# Patient Record
Sex: MALE | Race: BLACK OR AFRICAN AMERICAN | Employment: PART TIME | URBAN - METROPOLITAN AREA
[De-identification: names, ages, dates, MRNs, and addresses within clinical notes are randomized per-mention and may not be internally consistent; named-entity substitution may affect disease eponyms.]

---
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
## 2020-09-18 ENCOUNTER — HOSPITAL ENCOUNTER (OUTPATIENT)
Age: 20
Setting detail: SPECIMEN
Discharge: HOME | End: 2020-09-18
Payer: COMMERCIAL

## 2020-09-18 ENCOUNTER — OFFICE VISIT (OUTPATIENT)
Dept: FAMILY MEDICINE CLINIC | Age: 20
End: 2020-09-18
Payer: COMMERCIAL

## 2020-09-18 VITALS
WEIGHT: 123.2 LBS | HEART RATE: 96 BPM | TEMPERATURE: 98.2 F | BODY MASS INDEX: 20.53 KG/M2 | SYSTOLIC BLOOD PRESSURE: 131 MMHG | HEIGHT: 65 IN | DIASTOLIC BLOOD PRESSURE: 80 MMHG

## 2020-09-18 PROBLEM — Z11.4 ENCOUNTER FOR SCREENING FOR HIV: Status: ACTIVE | Noted: 2020-09-18

## 2020-09-18 PROBLEM — Z20.2 STD EXPOSURE: Status: ACTIVE | Noted: 2020-09-18

## 2020-09-18 PROBLEM — Z11.59 NEED FOR HEPATITIS C SCREENING TEST: Status: ACTIVE | Noted: 2020-09-18

## 2020-09-18 PROBLEM — Z86.59 HISTORY OF ADHD: Status: ACTIVE | Noted: 2020-09-18

## 2020-09-18 PROBLEM — R74.8 ELEVATED ALKALINE PHOSPHATASE LEVEL: Status: ACTIVE | Noted: 2020-09-18

## 2020-09-18 PROBLEM — R36.9 PENILE DISCHARGE: Status: ACTIVE | Noted: 2020-09-18

## 2020-09-18 PROBLEM — E55.9 HYPOVITAMINOSIS D: Status: ACTIVE | Noted: 2020-09-18

## 2020-09-18 LAB
ALBUMIN SERPL-MCNC: 4.6 G/DL (ref 3.5–5.2)
ALBUMIN/GLOBULIN RATIO: 1.4 (ref 1–2.5)
ALP BLD-CCNC: 91 U/L (ref 40–129)
ALT SERPL-CCNC: 13 U/L (ref 5–41)
ANION GAP SERPL CALCULATED.3IONS-SCNC: 12 MMOL/L (ref 9–17)
AST SERPL-CCNC: 20 U/L
BILIRUB SERPL-MCNC: 0.68 MG/DL (ref 0.3–1.2)
BILIRUBIN DIRECT: 0.17 MG/DL
BILIRUBIN, INDIRECT: 0.51 MG/DL (ref 0–1)
BUN BLDV-MCNC: 9 MG/DL (ref 6–20)
CALCIUM SERPL-MCNC: 10 MG/DL (ref 8.6–10.4)
CHLORIDE BLD-SCNC: 102 MMOL/L (ref 98–107)
CO2: 25 MMOL/L (ref 20–31)
CREAT SERPL-MCNC: 0.9 MG/DL (ref 0.7–1.2)
GFR AFRICAN AMERICAN: >60 ML/MIN
GFR NON-AFRICAN AMERICAN: >60 ML/MIN
GFR SERPL CREATININE-BSD FRML MDRD: NORMAL ML/MIN/{1.73_M2}
GFR SERPL CREATININE-BSD FRML MDRD: NORMAL ML/MIN/{1.73_M2}
GLUCOSE BLD-MCNC: 92 MG/DL (ref 70–99)
HAV IGM SER IA-ACNC: NONREACTIVE
HEPATITIS B CORE IGM ANTIBODY: NONREACTIVE
HEPATITIS B SURFACE ANTIGEN: NONREACTIVE
HEPATITIS C ANTIBODY: NONREACTIVE
HIV AG/AB: NONREACTIVE
POTASSIUM SERPL-SCNC: 3.8 MMOL/L (ref 3.7–5.3)
SODIUM BLD-SCNC: 139 MMOL/L (ref 135–144)
T. PALLIDUM, IGG: NONREACTIVE
TOTAL PROTEIN: 7.9 G/DL (ref 6.4–8.3)
VITAMIN D 25-HYDROXY: 27.3 NG/ML (ref 30–100)

## 2020-09-18 PROCEDURE — G8427 DOCREV CUR MEDS BY ELIG CLIN: HCPCS | Performed by: STUDENT IN AN ORGANIZED HEALTH CARE EDUCATION/TRAINING PROGRAM

## 2020-09-18 PROCEDURE — 6360000002 HC RX W HCPCS

## 2020-09-18 PROCEDURE — 4004F PT TOBACCO SCREEN RCVD TLK: CPT | Performed by: STUDENT IN AN ORGANIZED HEALTH CARE EDUCATION/TRAINING PROGRAM

## 2020-09-18 PROCEDURE — 96372 THER/PROPH/DIAG INJ SC/IM: CPT

## 2020-09-18 PROCEDURE — G8420 CALC BMI NORM PARAMETERS: HCPCS | Performed by: STUDENT IN AN ORGANIZED HEALTH CARE EDUCATION/TRAINING PROGRAM

## 2020-09-18 PROCEDURE — 99203 OFFICE O/P NEW LOW 30 MIN: CPT | Performed by: STUDENT IN AN ORGANIZED HEALTH CARE EDUCATION/TRAINING PROGRAM

## 2020-09-18 PROCEDURE — 99201 HC NEW PT, E/M LEVEL 1: CPT | Performed by: STUDENT IN AN ORGANIZED HEALTH CARE EDUCATION/TRAINING PROGRAM

## 2020-09-18 RX ORDER — AZITHROMYCIN 250 MG/1
1000 TABLET, FILM COATED ORAL ONCE
Status: COMPLETED | OUTPATIENT
Start: 2020-09-18 | End: 2020-09-18

## 2020-09-18 RX ORDER — CHOLECALCIFEROL (VITAMIN D3) 125 MCG
5 CAPSULE ORAL NIGHTLY PRN
COMMUNITY
End: 2022-06-08

## 2020-09-18 RX ORDER — CEFTRIAXONE SODIUM 250 MG/1
250 INJECTION, POWDER, FOR SOLUTION INTRAMUSCULAR; INTRAVENOUS ONCE
Status: COMPLETED | OUTPATIENT
Start: 2020-09-18 | End: 2020-09-18

## 2020-09-18 RX ADMIN — CEFTRIAXONE SODIUM 250 MG: 250 INJECTION, POWDER, FOR SOLUTION INTRAMUSCULAR; INTRAVENOUS at 10:14

## 2020-09-18 RX ADMIN — AZITHROMYCIN 1000 MG: 250 TABLET, FILM COATED ORAL at 10:23

## 2020-09-18 ASSESSMENT — PATIENT HEALTH QUESTIONNAIRE - PHQ9
SUM OF ALL RESPONSES TO PHQ QUESTIONS 1-9: 1
SUM OF ALL RESPONSES TO PHQ9 QUESTIONS 1 & 2: 1
1. LITTLE INTEREST OR PLEASURE IN DOING THINGS: 0
SUM OF ALL RESPONSES TO PHQ QUESTIONS 1-9: 1
2. FEELING DOWN, DEPRESSED OR HOPELESS: 1

## 2020-09-18 NOTE — PROGRESS NOTES
Subjective:    Celia Hurtado is a 21 y.o. male with  has a past medical history of ADHD (attention deficit hyperactivity disorder). Family History   Problem Relation Age of Onset    High Blood Pressure Mother     Diabetes Mother     Anemia Mother     Allergy (Severe) Mother         Seasonal    No Known Problems Father     No Known Problems Sister     No Known Problems Brother        Presented tothe office today for:  Chief Complaint   Patient presents with    New Patient     Est. Care    Exposure to STD     Girlfriend positive for Chlamydia/Gonorrhea     HPI  Establish care w/ PCP  Last PCP was years ago    CC:  STD Exposure, Chlamydia/Gonorrhea    Characteristics - patient has some discharge, yellow started a few days ago after having unprotected sex with his girlfriend. No dysuria, increased frequency, patient denies any hematuria or other urinary symptoms at this time. Sexual History  Partner(s) - 1 partner  Past Hx of STI (e.g. GC/Chlamydia, Syphillis) - none in the past  Practices (viginal, anal, oral) - vaginal  Safety - patient does not use condoms    Hx of ADHD, combined  Patient had been following with [facility] for history of ADHD, he had been on Vyvanse as well as dextroamphetamine, last fill date was May 2020. Last PDMP [place] [address] as Reviewed:  Review User Review Instant Review Result   ROBBIEJAVYJORJE 9/18/2020  9:13 AM Reviewed PDMP [1]      Hypovitaminosis D/Elevated AlkP  Patient has a history of low vitamin D 15.8, last checked in 2018. Also had an elevated alk phos in 2018, with no repeat labs since. Health Maintenance -   Depression- PHQ1 today  Alcohol misuse - no heavy drinking, ocassionally  Smoking - yes, marijuana. Review of Systems  Review of Systems   Constitutional: Negative for activity change, appetite change, chills, diaphoresis, fatigue, fever and unexpected weight change. HENT: Negative for sinus pressure, sinus pain, sore throat and trouble swallowing. Respiratory: Negative for cough, shortness of breath and wheezing. Cardiovascular: Negative for chest pain, palpitations and leg swelling. Gastrointestinal: Negative for abdominal pain, diarrhea, nausea and vomiting. Endocrine: Negative for cold intolerance, polydipsia, polyphagia and polyuria. Genitourinary: Negative for difficulty urinating, flank pain and frequency. Positive for STD exposure. Musculoskeletal: Negative for gait problem and joint swelling. Negative for back pain, neck pain and neck stiffness. Skin: Negative for color change and wound. Negative for pallor and rash. Allergic/Immunologic: Negative for environmental allergies and food allergies. Neurological: Negative for light-headedness, numbness and headaches. Psychiatric/Behavioral: Negative for sleep disturbance. Negative for confusion and suicidal ideas. Objective:    /80 (Site: Left Upper Arm, Position: Sitting, Cuff Size: Medium Adult) Comment: machine  Pulse 96   Temp 98.2 °F (36.8 °C) (Temporal)   Ht 5' 4.96\" (1.65 m)   Wt 123 lb 3.2 oz (55.9 kg)   BMI 20.53 kg/m²    BP Readings from Last 3 Encounters:   09/18/20 131/80       Physical Exam  Exam conducted with a chaperone present. Abdominal:      Hernia: There is no hernia in the left inguinal area or right inguinal area. Genitourinary:     Pubic Area: No rash or pubic lice. Penis: Discharge present. No phimosis, paraphimosis, hypospadias, erythema, tenderness, swelling or lesions. Scrotum/Testes: Normal.         Right: Mass, tenderness, swelling, testicular hydrocele or varicocele not present. Right testis is descended. Cremasteric reflex is present. Left: Mass, tenderness, swelling, testicular hydrocele or varicocele not present. Left testis is descended. Cremasteric reflex is present. Lymphadenopathy:      Lower Body: No right inguinal adenopathy. No left inguinal adenopathy.        Constitutional: Patient is oriented to person, place, and time. Patient appears well-developed and well-nourished. No distress. HENT: Head: Normocephalic and atraumatic. Eyes: Pupils are equal, round, and reactive to light. Conjunctivae are normal. Right eye exhibits no discharge. Left eye exhibits no discharge. Cardiovascular: Normal rate, regular rhythm and normal heart sounds. Pulmonary/Chest: Effort normal and breath sounds normal. No respiratory distress. Patient has no wheezes. : Examined w/ Pio Rubi MA present in the room. Abdominal: Soft. Bowel sounds are normal. Patient exhibits no distension. There is no tenderness. Musculoskeletal:  Patient exhibits no edema and tenderness. Patient exhibits no deformity. Neurological: Patient is alert and oriented to person, place, and time. Skin: Skin is warm and dry. Patient is not diaphoretic. Psychiatric: Patient's speech is normal and behavior is normal. Thought content normal. Patient's mood appears anxious. Vitals reviewed. Lab Results   Component Value Date    WBC 10.5 08/17/2018    HGB 14.2 08/17/2018    HCT 44.3 08/17/2018     08/17/2018    HDL 35 (L) 08/17/2018    ALT 12 08/17/2018    AST 19 08/17/2018     08/17/2018    K 4.3 08/17/2018     08/17/2018    CREATININE 0.84 08/17/2018    BUN 14 08/17/2018    CO2 26 08/17/2018    TSH 2.49 08/17/2018    LABA1C 5.6 08/17/2018     Lab Results   Component Value Date    CALCIUM 9.6 08/17/2018     Lab Results   Component Value Date    LDLCHOLESTEROL 82 08/17/2018       Assessment and Plan:    1. Penile discharge  -treating empirically today, plan for recheck in 3 month's time or earlier if there are any other signs/symptoms and to make sure there is resolution of symptoms. . RTC sooner if needed.  Discussed safe sex practices w/ patient and that Flo may contact the patient per their polices pending the results of the GC/Chlamydia  - azithromycin (ZITHROMAX) tablet 1,000 mg  - cefTRIAXone (ROCEPHIN) injection 250 mg    2. STD exposure  - Chlamydia/GC DNA, Urine; Future  - T. Pallidum Ab; Future    3. Hypovitaminosis D  - Vitamin D 25 Hydroxy; Future, pending results, may consider supplementation.  - Comprehensive Metabolic w/Bili Profile; Future    4. History of ADHD  -continue w/ [place]/Psychiatrist, symptoms are stable at this time. 5. Elevated alkaline phosphatase level  - Comprehensive Metabolic w/Bili Profile; Future    6. Encounter for screening for HIV  - HIV Screen; Future    7. Need for hepatitis C screening test  - Hepatitis Panel, Acute; Future      Requested Prescriptions      No prescriptions requested or ordered in this encounter       There are no discontinued medications. Romario Thompson received counseling on the following healthy behaviors: nutrition, exercise and medication adherence    Discussed use, benefit, and side effects of prescribed medications. Barriers to medication compliance addressed. All patient questions answered. Pt voiced understanding, with use of teach-back method. Return in about 3 months (around 12/18/2020), or if symptoms worsen or fail to improve, for f/u GV/Chlamydia. HM - Flu vaccine when available. Patient will obtain the HPV vaccine during next encounter, he will think about getting the Tdap risks and benefits as well as alternatives were provided to the patient. Patient will also obtain records for review prior to immunizations.

## 2020-09-18 NOTE — PROGRESS NOTES
Visit Information    Have you changed or started any medications since your last visit including any over-the-counter medicines, vitamins, or herbal medicines? no   Have you stopped taking any of your medications? Is so, why? -  no  Are you having any side effects from any of your medications? - no    Have you seen any other physician or provider since your last visit?  no   Have you had any other diagnostic tests since your last visit?  no   Have you been seen in the emergency room and/or had an admission in a hospital since we last saw you?  no   Have you had your routine dental cleaning in the past 6 months?  yes - July     Do you have an active [employer] account? If no, what is the barrier?   No: Pending    Patient Care Team:  Olu Grider MD as PCP - General (Family Medicine)    Medical History Review  Past Medical, Family, and Social History reviewed and does not contribute to the patient presenting condition    Health Maintenance   Topic Date Due    Varicella vaccine (1 of 2 - 2-dose childhood series) 08/05/2001    HPV vaccine (1 - Male 2-dose series) 08/05/2011    HIV screen  08/05/2015    DTaP/Tdap/Td vaccine (1 - Tdap) 08/05/2019    Flu vaccine (1) 09/01/2020    Hepatitis A vaccine  Aged Out    Hepatitis B vaccine  Aged Out    Hib vaccine  Aged Out    Meningococcal (ACWY) vaccine  Aged Out    Pneumococcal 0-64 years Vaccine  Aged Out

## 2020-09-18 NOTE — LETTER
To whom it may concern,          Consuelo Tierney, was in our office today for a doctors appointment with     her son Gely Rocha. Please excuse her from work for today. She may return     tomorrow 09/19/2020. Any questions please call office at [phone].               Sincerely,          Charity Allen

## 2020-09-18 NOTE — PATIENT INSTRUCTIONS
Thank you for letting us take care of you today. We hope all your questions were addressed. If a question was overlooked or something else comes to mind after you return home, please contact a member of your Care Team listed below. Your Care Team at [place] [address] is Team #1  Hal Arellano MD (Faculty)  Amy Dan (Resident)  Aura Miles MD (Resident)  Hunter Rodriguez, [facility] office)  Selvin Johnson, [address] (Clinical Practice Manager)  Isidro Pineda, [address] [facility] (Clinical Pharmacist)     Office phone number: [phone]    If you need to get in right away due to illness, please be advised we have \"Same Day\" appointments available Monday-Friday. Please call us at [phone] option #3 to schedule your \"Same Day\" appointment.

## 2020-09-19 LAB
CULTURE: NO GROWTH
Lab: NORMAL
SPECIMEN DESCRIPTION: NORMAL

## 2020-09-21 LAB
C. TRACHOMATIS DNA ,URINE: ABNORMAL
N. GONORRHOEAE DNA, URINE: ABNORMAL
SPECIMEN DESCRIPTION: ABNORMAL

## 2020-09-21 RX ORDER — MELATONIN
1000 DAILY
Qty: 90 TABLET | Refills: 1 | Status: SHIPPED | OUTPATIENT
Start: 2020-09-21 | End: 2022-06-08

## 2020-09-21 RX ORDER — MULTIVIT-MIN/IRON FUM/FOLIC AC 7.5 MG-4
1 TABLET ORAL DAILY
Qty: 90 TABLET | Refills: 1 | Status: SHIPPED | OUTPATIENT
Start: 2020-09-21 | End: 2022-06-08

## 2020-10-18 PROBLEM — Z11.4 ENCOUNTER FOR SCREENING FOR HIV: Status: RESOLVED | Noted: 2020-09-18 | Resolved: 2020-10-18

## 2020-10-18 PROBLEM — Z11.59 NEED FOR HEPATITIS C SCREENING TEST: Status: RESOLVED | Noted: 2020-09-18 | Resolved: 2020-10-18

## 2022-06-08 ENCOUNTER — HOSPITAL ENCOUNTER (EMERGENCY)
Age: 22
Discharge: HOME | End: 2022-06-08
Attending: EMERGENCY MEDICINE
Payer: COMMERCIAL

## 2022-06-08 ENCOUNTER — APPOINTMENT (OUTPATIENT)
Dept: GENERAL RADIOLOGY | Age: 22
End: 2022-06-08
Payer: COMMERCIAL

## 2022-06-08 VITALS
OXYGEN SATURATION: 100 % | BODY MASS INDEX: 21.66 KG/M2 | RESPIRATION RATE: 14 BRPM | DIASTOLIC BLOOD PRESSURE: 78 MMHG | HEART RATE: 85 BPM | SYSTOLIC BLOOD PRESSURE: 132 MMHG | TEMPERATURE: 97.9 F | WEIGHT: 130 LBS

## 2022-06-08 DIAGNOSIS — S60.222A CONTUSION OF LEFT HAND, INITIAL ENCOUNTER: Primary | ICD-10-CM

## 2022-06-08 PROCEDURE — 99283 EMERGENCY DEPT VISIT LOW MDM: CPT

## 2022-06-08 PROCEDURE — 73130 X-RAY EXAM OF HAND: CPT

## 2022-06-08 ASSESSMENT — PAIN DESCRIPTION - LOCATION: LOCATION: HAND

## 2022-06-08 ASSESSMENT — PAIN DESCRIPTION - PAIN TYPE: TYPE: ACUTE PAIN

## 2022-06-08 ASSESSMENT — PAIN DESCRIPTION - ORIENTATION: ORIENTATION: LEFT

## 2022-06-08 ASSESSMENT — PAIN SCALES - GENERAL: PAINLEVEL_OUTOF10: 10

## 2022-06-08 ASSESSMENT — PAIN - FUNCTIONAL ASSESSMENT: PAIN_FUNCTIONAL_ASSESSMENT: 0-10

## 2022-06-08 NOTE — ED TRIAGE NOTES
23 yo male had free weights fall on left hand (#90 each side of bar, total #180)  This happened at approximately 1400  Left hand swollen, painful to move fingers  Pt given ice pack

## 2022-06-08 NOTE — Clinical Note
Rosa Isela Jackson was seen and treated in our emergency department on 6/8/2022. He may return to work on 06/09/2022. If you have any questions or concerns, please don't hesitate to call.       Cooper Hickey, DO

## 2022-06-08 NOTE — ED PROVIDER NOTES
[facility]  ED  Emergency Department Encounter  EmergencyMedicine Resident     Pt Name:Mervin Thakur  MRN: [id number]  [place] 2000  Date of evaluation: 6/8/22  PCP:  Farhad Martin MD    [address] [place] [address]       Chief Complaint   Patient presents with    Hand Injury     free weights (total #180) dropped on left hand at approx 1400       HISTORY OF PRESENT ILLNESS  (Location/Symptom, Timing/Onset, Context/Setting, Quality, Duration, Modifying Factors, Severity.)      Violet Christensen is a 24 y.o. male who presents with hand pain. Patient states he was lifting weights and dropped a weight on his left hand. Patient is right-hand dominant. Patient states he has increased pain swelling decreased range of motion secondary to pain. Denies any other medical problems states he is use ice is not taking medications for symptoms. Denies any numbness or tingling. PAST MEDICAL / SURGICAL / SOCIAL / FAMILY HISTORY      has a past medical history of ADHD (attention deficit hyperactivity disorder). has no past surgical history on file. Social History     Socioeconomic History    Marital status: Single     Spouse name: Not on file    Number of children: Not on file    Years of education: Not on file    Highest education level: Not on file   Occupational History    Not on file   Tobacco Use    Smoking status: Current Some Day Smoker     Packs/day: 0.02     Years: 2.00     Pack years: 0.04     Types: Cigars    Smokeless tobacco: Never Used    Tobacco comment: occasional   Vaping Use    Vaping Use: Some days    Substances: Always   Substance and Sexual Activity    Alcohol use:  Yes    Drug use: Not Currently     Types: Marijuana Gaynelle [place])    Sexual activity: Yes   Other Topics Concern    Not on file   Social History Narrative    Not on file     Social Determinants of Health     Financial Resource Strain:     Difficulty of Paying Living Expenses: Not on file   Food Insecurity:     Worried About Running Out of Food in the Last Year: Not on file    Malaika of Food in the Last Year: Not on file   Transportation Needs:     Lack of Transportation (Medical): Not on file    Lack of Transportation (Non-Medical): Not on file   Physical Activity:     Days of Exercise per Week: Not on file    Minutes of Exercise per Session: Not on file   Stress:     Feeling of Stress : Not on file   Social Connections:     Frequency of Communication with Friends and Family: Not on file    Frequency of Social Gatherings with Friends and Family: Not on file    Attends Buddhism Services: Not on file    Active Member of [address] [place] [address] [employer] or Organizations: Not on file    Attends Club or Organization Meetings: Not on file    Marital Status: Not on file   Intimate Partner Violence:     Fear of Current or Ex-Partner: Not on file    Emotionally Abused: Not on file    Physically Abused: Not on file    Sexually Abused: Not on file   Housing Stability:     Unable to Pay for Housing in the Last Year: Not on file    Number of [place] in the Last Year: Not on file    Unstable Housing in the Last Year: Not on file       Family History   Problem Relation Age of Onset    High Blood Pressure Mother     Diabetes Mother     Anemia Mother     Allergy (Severe) Mother         Seasonal    No Known Problems Father     No Known Problems Sister     No Known Problems Brother        Allergies:  Patient has no known allergies. Home Medications:  Prior to Admission medications    Not on File       REVIEW OF SYSTEMS    (2-9 systems for level 4, 10 or more for level 5)      Review of Systems   Constitutional: Negative for chills and fever. Musculoskeletal:        Left hand pain   Skin: Negative for wound.        PHYSICAL EXAM   (up to 7 for level 4, 8 or more for level 5)      INITIAL VITALS:   /78   Pulse 85   Temp 97.9 °F (36.6 °C) (Oral)   Resp 14   Wt 130 lb (59 kg)   SpO2 100%   BMI 21.66 kg/m² Physical Exam  Vitals reviewed. Constitutional:       Comments: Patient is well-appearing sitting on on stretcher in no acute distress   HENT:      Head: Normocephalic and atraumatic. Cardiovascular:      Rate and Rhythm: Normal rate. Pulmonary:      Comments: Wheezing comfortable room air, symmetric chest rise, speaking full sentences, no evidence respirator stress  Musculoskeletal:      Comments: Mild left hand swelling, tenderness over the metacarpals, neurovascular intact, no obvious open lesions, decreased range of motion secondary due to a effort/pain no snuffbox tenderness   Neurological:      Gait: Gait normal.   Psychiatric:         Mood and Affect: Mood normal.         Behavior: Behavior normal.         Thought Content: Thought content normal.         Judgment: Judgment normal.         DIFFERENTIAL  DIAGNOSIS     PLAN (LABS / IMAGING / EKG):  Orders Placed This Encounter   Procedures    XR HAND LEFT (MIN 3 VIEWS)       MEDICATIONS ORDERED:  No orders of the defined types were placed in this encounter. DDX: Fracture, sprain, strain, contusion    DIAGNOSTIC RESULTS / EMERGENCY DEPARTMENT COURSE / MDM   :  No results found for this visit on 06/08/22. RADIOLOGY:  No acute osseous abnormality    EKG  None    All EKG's are interpreted by the Emergency Department Physician who either signs or Co-signs this chart in the absence of a cardiologist.    EMERGENCY DEPARTMENT COURSE/IMPRESSION: 22-year-old male present emergency department with left hand pain after weight dropped on his hand. Patient is right-hand dominant. Patient has some mild swelling, there is tenderness over the metacarpals mainly the third through fifth. We will plan for x-ray apply ice, patient declined any Motrin or Tylenol for pain control. No other medical problems.   X-ray was unremarkable, symptoms likely secondary to contusion, educated patient on symptomatic treatment with rest ice compression elevation Tylenol Motrin as needed, strict return precautions were discussed, educate patient follow primary care provider as needed. Ace Wrap applied for comfort. PROCEDURES:  None    CONSULTS:  None    CRITICAL CARE:  None    FINAL IMPRESSION      1. Contusion of left hand, initial encounter          DISPOSITION / PLAN     DISPOSITION Decision To Discharge 06/08/2022 03:27:48 PM      PATIENT REFERRED TO:  Malvin Chapman MD  [address]   [address]  [id number]  [phone]      As needed, If symptoms worsen    [facility]  [address] [facility]  [phone]    As needed, If symptoms worsen      DISCHARGE MEDICATIONS:  New Prescriptions    No medications on file       Lucila Smiley DO  Emergency Medicine Resident    (Please note that portions of thisnote were completed with a voice recognition program.  Efforts were made to edit the dictations but occasionally words are mis-transcribed.)     Lucila Smiley DO  Resident  06/08/22 [facility] [address] [place] [address], DO  Resident  06/08/22 [id number]

## 2022-06-08 NOTE — ED PROVIDER NOTES
[facility]  ED     Emergency Department     Faculty Attestation        I performed a history and physical examination of the patient and discussed management with the resident. I reviewed the residents note and agree with the documented findings and plan of care. Any areas of disagreement are noted on the chart. I was personally present for the key portions of any procedures. I have documented in the chart those procedures where I was not present during the key portions. I have reviewed the emergency nurses triage note. I agree with the chief complaint, past medical history, past surgical history, allergies, medications, social and family history as documented unless otherwise noted below. For mid-level providers such as nurse practitioners as well as physicians assistants:    I have personally seen and evaluated the patient. I find the patient's history and physical exam are consistent with NP/PA documentation. I agree with the care provided, treatment rendered, disposition, & follow-up plan. Additional findings are as noted. Vital Signs: There were no vitals taken for this visit.   PCP:  Farhad Martin MD    Pertinent Comments:           Critical Care  None          Gavino De La Torre MD    Attending Emergency Medicine Physician              Ammy Miller MD  06/08/22 [id number]

## 2023-02-17 ENCOUNTER — HOSPITAL ENCOUNTER (EMERGENCY)
Age: 23
Discharge: HOME | End: 2023-02-18
Attending: EMERGENCY MEDICINE
Payer: COMMERCIAL

## 2023-02-17 ENCOUNTER — APPOINTMENT (OUTPATIENT)
Dept: GENERAL RADIOLOGY | Age: 23
End: 2023-02-17
Payer: COMMERCIAL

## 2023-02-17 DIAGNOSIS — R41.82 ALTERED MENTAL STATUS, UNSPECIFIED ALTERED MENTAL STATUS TYPE: Primary | ICD-10-CM

## 2023-02-17 PROCEDURE — 93005 ELECTROCARDIOGRAM TRACING: CPT | Performed by: STUDENT IN AN ORGANIZED HEALTH CARE EDUCATION/TRAINING PROGRAM

## 2023-02-17 PROCEDURE — 80143 DRUG ASSAY ACETAMINOPHEN: CPT

## 2023-02-17 PROCEDURE — G0480 DRUG TEST DEF 1-7 CLASSES: HCPCS

## 2023-02-17 PROCEDURE — 99285 EMERGENCY DEPT VISIT HI MDM: CPT

## 2023-02-17 PROCEDURE — 84484 ASSAY OF TROPONIN QUANT: CPT

## 2023-02-17 PROCEDURE — 2580000003 HC RX 258: Performed by: STUDENT IN AN ORGANIZED HEALTH CARE EDUCATION/TRAINING PROGRAM

## 2023-02-17 PROCEDURE — 83605 ASSAY OF LACTIC ACID: CPT

## 2023-02-17 PROCEDURE — 80048 BASIC METABOLIC PNL TOTAL CA: CPT

## 2023-02-17 PROCEDURE — 85025 COMPLETE CBC W/AUTO DIFF WBC: CPT

## 2023-02-17 PROCEDURE — 80307 DRUG TEST PRSMV CHEM ANLYZR: CPT

## 2023-02-17 PROCEDURE — 80179 DRUG ASSAY SALICYLATE: CPT

## 2023-02-17 RX ORDER — 0.9 % SODIUM CHLORIDE 0.9 %
1000 INTRAVENOUS SOLUTION INTRAVENOUS ONCE
Status: COMPLETED | OUTPATIENT
Start: 2023-02-17 | End: 2023-02-18

## 2023-02-17 RX ADMIN — SODIUM CHLORIDE 1000 ML: 9 INJECTION, SOLUTION INTRAVENOUS at 23:51

## 2023-02-18 ENCOUNTER — APPOINTMENT (OUTPATIENT)
Dept: CT IMAGING | Age: 23
End: 2023-02-18
Payer: COMMERCIAL

## 2023-02-18 ENCOUNTER — APPOINTMENT (OUTPATIENT)
Dept: GENERAL RADIOLOGY | Age: 23
End: 2023-02-18
Payer: COMMERCIAL

## 2023-02-18 VITALS
WEIGHT: 130 LBS | SYSTOLIC BLOOD PRESSURE: 119 MMHG | HEART RATE: 84 BPM | TEMPERATURE: 97.8 F | OXYGEN SATURATION: 97 % | DIASTOLIC BLOOD PRESSURE: 70 MMHG | RESPIRATION RATE: 17 BRPM | BODY MASS INDEX: 21.66 KG/M2

## 2023-02-18 LAB
ABSOLUTE EOS #: 0.15 K/UL (ref 0–0.44)
ABSOLUTE IMMATURE GRANULOCYTE: 0.07 K/UL (ref 0–0.3)
ABSOLUTE LYMPH #: 4.96 K/UL (ref 1.1–3.7)
ABSOLUTE MONO #: 1.15 K/UL (ref 0.1–1.2)
ACETAMINOPHEN LEVEL: <5 UG/ML (ref 10–30)
ANION GAP SERPL CALCULATED.3IONS-SCNC: 23 MMOL/L (ref 9–17)
BASOPHILS # BLD: 1 % (ref 0–2)
BASOPHILS ABSOLUTE: 0.11 K/UL (ref 0–0.2)
BUN SERPL-MCNC: 11 MG/DL (ref 6–20)
CALCIUM SERPL-MCNC: 9 MG/DL (ref 8.6–10.4)
CHLORIDE SERPL-SCNC: 102 MMOL/L (ref 98–107)
CO2 SERPL-SCNC: 20 MMOL/L (ref 20–31)
CREAT SERPL-MCNC: 1.14 MG/DL (ref 0.7–1.2)
EKG ATRIAL RATE: 108 BPM
EKG P AXIS: 62 DEGREES
EKG P-R INTERVAL: 132 MS
EKG Q-T INTERVAL: 334 MS
EKG QRS DURATION: 86 MS
EKG QTC CALCULATION (BAZETT): 447 MS
EKG R AXIS: 79 DEGREES
EKG T AXIS: 38 DEGREES
EKG VENTRICULAR RATE: 108 BPM
EOSINOPHILS RELATIVE PERCENT: 1 % (ref 1–4)
ETHANOL PERCENT: <0.01 %
ETHANOL: <10 MG/DL
GFR SERPL CREATININE-BSD FRML MDRD: >60 ML/MIN/1.73M2
GLUCOSE SERPL-MCNC: 183 MG/DL (ref 70–99)
HCT VFR BLD AUTO: 45 % (ref 40.7–50.3)
HGB BLD-MCNC: 13.6 G/DL (ref 13–17)
IMMATURE GRANULOCYTES: 1 %
LACTIC ACID, WHOLE BLOOD: 2 MMOL/L (ref 0.7–2.1)
LACTIC ACID, WHOLE BLOOD: 6 MMOL/L (ref 0.7–2.1)
LYMPHOCYTES # BLD: 38 % (ref 24–43)
MCH RBC QN AUTO: 26.8 PG (ref 25.2–33.5)
MCHC RBC AUTO-ENTMCNC: 30.2 G/DL (ref 28.4–34.8)
MCV RBC AUTO: 88.8 FL (ref 82.6–102.9)
MONOCYTES # BLD: 9 % (ref 3–12)
NRBC AUTOMATED: 0 PER 100 WBC
PDW BLD-RTO: 14.3 % (ref 11.8–14.4)
PLATELET # BLD AUTO: 354 K/UL (ref 138–453)
PMV BLD AUTO: 9.8 FL (ref 8.1–13.5)
POTASSIUM SERPL-SCNC: 4 MMOL/L (ref 3.7–5.3)
RBC # BLD: 5.07 M/UL (ref 4.21–5.77)
REASON FOR REJECTION: NORMAL
SALICYLATE LEVEL: <1 MG/DL (ref 3–10)
SEG NEUTROPHILS: 50 % (ref 36–65)
SEGMENTED NEUTROPHILS ABSOLUTE COUNT: 6.78 K/UL (ref 1.5–8.1)
SODIUM SERPL-SCNC: 145 MMOL/L (ref 135–144)
TOXIC TRICYCLIC SC,BLOOD: NEGATIVE
TROPONIN I SERPL DL<=0.01 NG/ML-MCNC: 11 NG/L (ref 0–22)
WBC # BLD AUTO: 13.2 K/UL (ref 3.5–11.3)
ZZ NTE CLEAN UP: ORDERED TEST: NORMAL
ZZ NTE WITH NAME CLEAN UP: SPECIMEN SOURCE: NORMAL

## 2023-02-18 PROCEDURE — 70450 CT HEAD/BRAIN W/O DYE: CPT

## 2023-02-18 PROCEDURE — 93010 ELECTROCARDIOGRAM REPORT: CPT | Performed by: INTERNAL MEDICINE

## 2023-02-18 PROCEDURE — 71045 X-RAY EXAM CHEST 1 VIEW: CPT

## 2023-02-18 PROCEDURE — 36415 COLL VENOUS BLD VENIPUNCTURE: CPT

## 2023-02-18 PROCEDURE — 83605 ASSAY OF LACTIC ACID: CPT

## 2023-02-18 PROCEDURE — 87040 BLOOD CULTURE FOR BACTERIA: CPT

## 2023-02-18 PROCEDURE — 2580000003 HC RX 258: Performed by: STUDENT IN AN ORGANIZED HEALTH CARE EDUCATION/TRAINING PROGRAM

## 2023-02-18 RX ORDER — 0.9 % SODIUM CHLORIDE 0.9 %
1000 INTRAVENOUS SOLUTION INTRAVENOUS ONCE
Status: COMPLETED | OUTPATIENT
Start: 2023-02-18 | End: 2023-02-18

## 2023-02-18 RX ADMIN — SODIUM CHLORIDE 1000 ML: 9 INJECTION, SOLUTION INTRAVENOUS at 00:58

## 2023-02-18 NOTE — ED NOTES
Spoke to Arnulfo pts mom. She stated she will come pick pt up.      Darleen Espinoza RN  02/18/23 [id number]

## 2023-02-18 NOTE — DISCHARGE INSTRUCTIONS
Call [facility] [phone] or go to [facility] at 7am Monday - Friday to be evaluated (this is first come first serve) to be evaluated for assistance with stopping. You can also contact Narcotics Anonymous for assistance. Stop using drugs. You can use diphenhydramine (Benadryl) for any feeling of anxiousness. PLEASE RETURN TO THE EMERGENCY DEPARTMENT IMMEDIATELY for worsening symptoms, or if you develop any concerning symptoms such as: high fever not relieved by acetaminophen (Tylenol) and/or ibuprofen (Motrin / Advil), chills, shortness of breath, chest pain, feeling of your heart fluttering or racing, persistent nausea and/or vomiting, vomiting up blood, blood in your stool, numbness, loss of consciousness, weakness or tingling in the arms or legs or change in color of the extremities, changes in mental status, persistent headache, blurry vision, loss of bladder / bowel control, unable to follow up with your physician, or other any other care or concern.

## 2023-02-18 NOTE — ED PROVIDER NOTES
[facility]  Emergency Department Encounter  Emergency Medicine Resident     Pt Name:Mervin Mallory  MRN: [id number]  [place] 2000  Date of evaluation: 2/18/23  PCP:  Precious Valdez MD  Note Started: 1:20 AM EST      CHIEF COMPLAINT       Chief Complaint   Patient presents with    Drug Overdose       HISTORY OF PRESENT ILLNESS  (Location/Symptom, Timing/Onset, Context/Setting, Quality, Duration, Modifying Factors, Severity.)      Katey Stout is a 25 y.o. male who presents with unresponsiveness. Patient was brought in by EMS after receiving 2 mg of Narcan IV and 2 mg of Narcan IN. It improved his respirations, and patient was combative with EMS. EMS was not given a story by bystanders on the scene, who were with the patient and reportedly friends with the patient. Discussion with patient's mom, he has not had any recent illnesses or taking any new medications. Narcan did improve the patient's symptoms including his pinpoint pupils on scene, EMS believes possible opiate ingestion. PAST MEDICAL / SURGICAL / SOCIAL / FAMILY HISTORY      has a past medical history of ADHD (attention deficit hyperactivity disorder).     No PSH      Social History     Socioeconomic History    Marital status: Single     Spouse name: Not on file    Number of children: Not on file    Years of education: Not on file    Highest education level: Not on file   Occupational History    Not on file   Tobacco Use    Smoking status: Some Days     Packs/day: 0.02     Years: 2.00     Pack years: 0.04     Types: Cigars, Cigarettes    Smokeless tobacco: Never    Tobacco comments:     occasional   Vaping Use    Vaping Use: Some days    Substances: Always   Substance and Sexual Activity    Alcohol use: Yes    Drug use: Not Currently     Types: Marijuana Do Gouty)    Sexual activity: Yes   Other Topics Concern    Not on file   Social History Narrative    Not on file     Social Determinants of Health Financial Resource Strain: Not on file   Food Insecurity: Not on file   Transportation Needs: Not on file   Physical Activity: Not on file   Stress: Not on file   Social Connections: Not on file   Intimate Partner Violence: Not on file   Housing Stability: Not on file       Family History   Problem Relation Age of Onset    High Blood Pressure Mother     Diabetes Mother     Anemia Mother     Allergy (Severe) Mother         Seasonal    No Known Problems Father     No Known Problems Sister     No Known Problems Brother        Allergies:  Patient has no known allergies. Home Medications:  Prior to Admission medications    Not on File           REVIEW OF SYSTEMS       Review of Systems   Unable to perform ROS: Patient unresponsive     PHYSICAL EXAM      INITIAL VITALS:   /81   Pulse 95   Temp 97.8 °F (36.6 °C) (Oral)   Resp 27   Wt 130 lb (59 kg)   SpO2 93%   BMI 21.66 kg/m²     Physical Exam  Constitutional:       Comments: GCS 10. Somnolent. HENT:      Head: Normocephalic and atraumatic. Nose: Nose normal.      Mouth/Throat:      Mouth: Mucous membranes are moist.   Eyes:      Conjunctiva/sclera: Conjunctivae normal.      Pupils: Pupils are equal, round, and reactive to light. Cardiovascular:      Rate and Rhythm: Regular rhythm. Tachycardia present. Heart sounds: Normal heart sounds. Pulmonary:      Effort: Pulmonary effort is normal. No respiratory distress. Breath sounds: Normal breath sounds. No stridor. Abdominal:      General: Abdomen is flat. There is no distension. Palpations: Abdomen is soft. Tenderness: There is no abdominal tenderness. There is no guarding. Musculoskeletal:         General: Normal range of motion. Cervical back: Neck supple. No tenderness. Right lower leg: No edema. Left lower leg: No edema. Skin:     General: Skin is warm. Capillary Refill: Capillary refill takes 2 to 3 seconds.    Neurological:      Mental Status: He is disoriented. GCS: GCS eye subscore is 2. GCS verbal subscore is 3. GCS motor subscore is 5. DDX/DIAGNOSTIC RESULTS / EMERGENCY DEPARTMENT COURSE / MDM     Medical Decision Making  Differential including ethanol intoxication, via intoxication, ecstasy, less likely anticholinergic. Will give fluids and supportive care. Amount and/or Complexity of Data Reviewed  Independent Historian: parent  External Data Reviewed: notes. Labs: ordered. Decision-making details documented in ED Course. Radiology: ordered. Risk  Prescription drug management. EMERGENCY DEPARTMENT COURSE:    ED Course as of 02/18/23 0308   Sat Feb 18, 2023   0023 Lactic Acid, Whole Blood(!): 6.0 [ML]   0053 WBC(!): 13.2  Concern for SIRS with elevated wbc and HR, however could be reactive to substances. [ML]   0114 Negative CT head, possible retrocardiac infiltrate seen on chest x-ray. Suggesting possible pneumonia. [ML]   0234 Lactic Acid, Whole Blood: 2.0 [ML]      ED Course User Index  [ML] Mellisa Palacios DO       FINAL IMPRESSION      1.  Altered mental status, unspecified altered mental status type          DISPOSITION / PLAN     DISPOSITION  - pending    Robles Klein DO  Emergency Medicine Resident    (Please note that portions of thisnote were completed with a voice recognition program.  Efforts were made to edit the dictations but occasionally words are mis-transcribed.)       Mellisa Palacios DO  Resident  02/18/23 2186

## 2023-02-18 NOTE — ED NOTES
The following labs were labeled with appropriate pt sticker and tubed to lab:     [] Blue     [] Lavender   [] on ice  [] Green/yellow  [x] Green/black [x] on ice  [] Grey  [] on ice  [] Yellow  [] Red  [] Type/ Screen  [] ABG  [] VBG    [] COVID-19 swab    [] Rapid  [] PCR  [] Flu swab  [] Peds Viral Panel     [] Urine Sample  [] Fecal Sample  [] Pelvic Cultures  [x] Blood Cultures  [x] X 2  [] STREP Cultures         Tamiko Shetty RN  02/18/23 6352

## 2023-02-18 NOTE — ED PROVIDER NOTES
[facility]     Emergency Department     Faculty Attestation    I performed a history and physical examination of the patient and discussed management with the resident. I reviewed the residents note and agree with the documented findings and plan of care. Any areas of disagreement are noted on the chart. I was personally present for the key portions of any procedures. I have documented in the chart those procedures where I was not present during the key portions. I have reviewed the emergency nurses triage note. I agree with the chief complaint, past medical history, past surgical history, allergies, medications, social and family history as documented unless otherwise noted below. For Physician Assistant/ Nurse Practitioner cases/documentation I have personally evaluated this patient and have completed at least one if not all key elements of the E/M (history, physical exam, and MDM). Additional findings are as noted. Patient arrives by EMS for suspected opioid overdose. Found down unresponsive received 2 intranasal for IV Narcan with improvement. On arrival patient shaking chills localizes pain answers simple questions. Pupils not pinpoint protecting airway. Heart is tachycardic but regular abdomen soft nontender. Will monitor closely need for additional Narcan, reevaluate. EKG interpretation: Sinus rhythm 108 normal intervals normal axis no acute ST or T changes no acute findings      Critical Care     CRITICAL CARE: There was a high probability of clinically significant/life threatening deterioration in this patient's condition which required my urgent intervention. Total critical care time was less than 30 minutes. This excludes any time for separately reportable procedures.        Herman Solorio MD, Norah Bloch  Attending Emergency  Physician           Herman Solorio MD  02/17/23 5970    5:34 AM EST  Patient awoke to light tactile stimuli at this time was able to tell me his name denied any needs at this time but still fell back asleep without stimulation. We will continue to monitor    6:23 AM EST  Patient woke to voice at this time awake alert and oriented appropriate able to stand without assistance. States he is thirsty wants to go to the bathroom.   Will ensure can ambulate tolerate p.o. plan discharge     Belkys Lozano MD  02/18/23 8299

## 2023-02-18 NOTE — ED NOTES
Patient desat to 87% on room air, patient sleeping, difficult to arouse, placed on 3L nasal cannula, O2 sat WNL after O2 admin, Dr. Marilou Kuhn notified.       Zainab Bull RN  02/18/23 6253

## 2023-02-18 NOTE — ED PROVIDER NOTES
[facility]  Emergency Department  Emergency Medicine Resident Sign-out     Care of Isaiah Brewster was assumed from Dr. Reyes Salts and is being seen for Drug Overdose  . The patient's initial evaluation and plan have been discussed with the prior provider who initially evaluated the patient. EMERGENCY DEPARTMENT COURSE / MEDICAL DECISION MAKING:       MEDICATIONS GIVEN:  Orders Placed This Encounter   Medications    0.9 % sodium chloride bolus    0.9 % sodium chloride bolus       LABS / RADIOLOGY:     Results for orders placed or performed during the hospital encounter of 02/17/23   Culture, Blood 1    Specimen: Blood   Result Value Ref Range    Specimen Description . BLOOD     Special Requests R FA 10ML     Culture NO GROWTH <24 HRS    Culture, Blood 1    Specimen: Blood   Result Value Ref Range    Specimen Description . BLOOD     Special Requests L FA 1ML     Culture NO GROWTH <24 HRS    Basic Metabolic Panel   Result Value Ref Range    Glucose 183 (H) 70 - 99 mg/dL    BUN 11 6 - 20 mg/dL    Creatinine 1.14 0.70 - 1.20 mg/dL    Est, Glom Filt Rate >60 >60 mL/min/1.73m2    Calcium 9.0 8.6 - 10.4 mg/dL    Sodium 145 (H) 135 - 144 mmol/L    Potassium 4.0 3.7 - 5.3 mmol/L    Chloride 102 98 - 107 mmol/L    CO2 20 20 - 31 mmol/L    Anion Gap 23 (H) 9 - 17 mmol/L   CBC with Auto Differential   Result Value Ref Range    WBC 13.2 (H) 3.5 - 11.3 k/uL    RBC 5.07 4.21 - 5.77 m/uL    Hemoglobin 13.6 13.0 - 17.0 g/dL    Hematocrit 45.0 40.7 - 50.3 %    MCV 88.8 82.6 - 102.9 fL    MCH 26.8 25.2 - 33.5 pg    MCHC 30.2 28.4 - 34.8 g/dL    RDW 14.3 11.8 - 14.4 %    Platelets 929 341 - 914 k/uL    MPV 9.8 8.1 - 13.5 fL    NRBC Automated 0.0 0.0 per 100 WBC    Seg Neutrophils 50 36 - 65 %    Lymphocytes 38 24 - 43 %    Monocytes 9 3 - 12 %    Eosinophils % 1 1 - 4 %    Basophils 1 0 - 2 %    Immature Granulocytes 1 (H) 0 %    Segs Absolute 6.78 1.50 - 8.10 k/uL    Absolute Lymph # 4.96 (H) 1.10 - 3.70 k/uL    Absolute Mono # 1.15 0.10 - 1.20 k/uL    Absolute Eos # 0.15 0.00 - 0.44 k/uL    Basophils Absolute 0.11 0.00 - 0.20 k/uL    Absolute Immature Granulocyte 0.07 0.00 - 0.30 k/uL   TOX SCR, BLD, ED   Result Value Ref Range    Acetaminophen Level <5 (L) 10 - 30 ug/mL    Ethanol <10 <10 mg/dL    Ethanol percent <8.669 <2.130 %    Salicylate Lvl <1 (L) 3 - 10 mg/dL    Toxic Tricyclic Sc,Blood NEGATIVE NEGATIVE   Lactic Acid   Result Value Ref Range    Lactic Acid, Whole Blood 6.0 (H) 0.7 - 2.1 mmol/L   Troponin   Result Value Ref Range    Troponin, High Sensitivity 11 0 - 22 ng/L   SPECIMEN REJECTION   Result Value Ref Range    Specimen Source . BLOOD     Ordered Test LACTIC     Reason for Rejection Unable to perform testing: Specimen clotted. Lactic Acid   Result Value Ref Range    Lactic Acid, Whole Blood 2.0 0.7 - 2.1 mmol/L       CT HEAD WO CONTRAST    Result Date: 2/18/2023  EXAMINATION: CT OF THE HEAD WITHOUT CONTRAST  2/18/2023 12:26 am TECHNIQUE: CT of the head was performed without the administration of intravenous contrast. Automated exposure control, iterative reconstruction, and/or weight based adjustment of the mA/kV was utilized to reduce the radiation dose to as low as reasonably achievable. COMPARISON: None. HISTORY: ORDERING SYSTEM PROVIDED HISTORY: AMS TECHNOLOGIST PROVIDED HISTORY: AMS Decision Support Exception - unselect if not a suspected or confirmed emergency medical condition->Emergency Medical Condition (MA) Reason for Exam: AMS FINDINGS: BRAIN/VENTRICLES: There is no acute intracranial hemorrhage, mass effect or midline shift. No abnormal extra-axial fluid collection. The gray-white differentiation is maintained without evidence of an acute infarct. There is no evidence of hydrocephalus. ORBITS: The visualized portion of the orbits demonstrate no acute abnormality. SINUSES: The visualized paranasal sinuses and mastoid air cells demonstrate no acute abnormality.  SOFT TISSUES/SKULL: No acute abnormality of the visualized skull or soft tissues. No acute intracranial abnormality. XR CHEST PORTABLE    Result Date: 2/18/2023  EXAMINATION: ONE XRAY VIEW OF THE CHEST 2/18/2023 12:15 am COMPARISON: None. HISTORY: ORDERING SYSTEM PROVIDED HISTORY: AMS TECHNOLOGIST PROVIDED HISTORY: AMS Reason for Exam: upr,ams,overdose,pt unresponsive FINDINGS: Possible mild edema. Possible retrocardiac airspace disease. Cardiac silhouette mediastinal shadow normal.  Bony thorax intact. Possible retrocardiac infiltrate and mild edema       RECENT VITALS:     Temp: 97.8 °F (36.6 °C),  Heart Rate: 67, Resp: 27, BP: 116/65, SpO2: 92 %    This patient is a 25 y.o. Male with altered mental status. Patient was found down by a friend who does not know what substance he ingested. EMS gave him 4 mg of Narcan total which improved his respirations and his mental status. He became combative with EMS. Here in the emergency department, patient has been somnolent but protecting his airway. CT head was negative, lab work was concerning for slightly elevated white blood cell count, possible retrocardiac pneumonia, unlikely given patient has had no symptoms last few days per his mother. Likely just reactive leukocytosis due to intoxication. Pending improvement in neurological status. ED Course as of 02/18/23 0608   Sat Feb 18, 2023   0023 Lactic Acid, Whole Blood(!): 6.0 [ML]   0053 WBC(!): 13.2  Concern for SIRS with elevated wbc and HR, however could be reactive to substances. [ML]   0114 Negative CT head, possible retrocardiac infiltrate seen on chest x-ray. Suggesting possible pneumonia. [ML]   0234 Lactic Acid, Whole Blood: 2.0 [ML]   0323 Care assumed from Dr. Zaynab Bruno awaiting reevaluation. [JS]   [id number] Patient care transferred to Dr. Abigail Gonzalez.  [JS]      ED Course User Index  [JS] Rubi Escalera DO  [ML] Obdulio Mcgill DO         OUTSTANDING TASKS / RECOMMENDATIONS:    Reassess  Dispo     FINAL IMPRESSION: 1. Altered mental status, unspecified altered mental status type        DISPOSITION:         DISPOSITION:  []  Home   []  Nursing Facility   []  Transfer -    []  Admission -     FOLLOW-UP: No follow-up provider specified.    DISCHARGE MEDICATIONS: New Prescriptions    No medications on file          Connie Miller DO  Emergency Medicine Resident  [facility] [place]  Resident  02/18/23 9348

## 2023-02-18 NOTE — ED PROVIDER NOTES
[facility]  Emergency Department  Emergency Medicine Sign-out     Care of Idris Williamson was assumed from Dr. Kieran Diaz and is being seen for Drug Overdose  . The patient's initial evaluation and plan have been discussed with the prior attending who initially evaluated the patient. EMERGENCY DEPARTMENT COURSE / MEDICAL DECISION MAKING:       MEDICATIONS GIVEN:  Orders Placed This Encounter   Medications    0.9 % sodium chloride bolus    0.9 % sodium chloride bolus       LABS / RADIOLOGY:     Labs Reviewed   BASIC METABOLIC PANEL - Abnormal; Notable for the following components:       Result Value    Glucose 183 (*)     Sodium 145 (*)     Anion Gap 23 (*)     All other components within normal limits   CBC WITH AUTO DIFFERENTIAL - Abnormal; Notable for the following components:    WBC 13.2 (*)     Immature Granulocytes 1 (*)     Absolute Lymph # 4.96 (*)     All other components within normal limits   TOX SCR, BLD, ED - Abnormal; Notable for the following components:    Acetaminophen Level <5 (*)     Salicylate Lvl <1 (*)     All other components within normal limits   LACTIC ACID - Abnormal; Notable for the following components:    Lactic Acid, Whole Blood 6.0 (*)     All other components within normal limits   CULTURE, BLOOD 1   CULTURE, BLOOD 1   CULTURE, URINE   TROPONIN   SPECIMEN REJECTION   LACTIC ACID   URINALYSIS WITH MICROSCOPIC   PROTIME-INR   PREVIOUS SPECIMEN       CT HEAD WO CONTRAST    Result Date: 2/18/2023  EXAMINATION: CT OF THE HEAD WITHOUT CONTRAST  2/18/2023 12:26 am TECHNIQUE: CT of the head was performed without the administration of intravenous contrast. Automated exposure control, iterative reconstruction, and/or weight based adjustment of the mA/kV was utilized to reduce the radiation dose to as low as reasonably achievable. COMPARISON: None.  HISTORY: ORDERING SYSTEM PROVIDED HISTORY: AMS TECHNOLOGIST PROVIDED HISTORY: AMS Decision Support Exception - unselect if not a suspected or confirmed emergency medical condition->Emergency Medical Condition (MA) Reason for Exam: AMS FINDINGS: BRAIN/VENTRICLES: There is no acute intracranial hemorrhage, mass effect or midline shift. No abnormal extra-axial fluid collection. The gray-white differentiation is maintained without evidence of an acute infarct. There is no evidence of hydrocephalus. ORBITS: The visualized portion of the orbits demonstrate no acute abnormality. SINUSES: The visualized paranasal sinuses and mastoid air cells demonstrate no acute abnormality. SOFT TISSUES/SKULL:  No acute abnormality of the visualized skull or soft tissues. No acute intracranial abnormality. XR CHEST PORTABLE    Result Date: 2/18/2023  EXAMINATION: ONE XRAY VIEW OF THE CHEST 2/18/2023 12:15 am COMPARISON: None. HISTORY: ORDERING SYSTEM PROVIDED HISTORY: AMS TECHNOLOGIST PROVIDED HISTORY: AMS Reason for Exam: upr,ams,overdose,pt unresponsive FINDINGS: Possible mild edema. Possible retrocardiac airspace disease. Cardiac silhouette mediastinal shadow normal.  Bony thorax intact. Possible retrocardiac infiltrate and mild edema       RECENT VITALS:     Temp: 97.8 °F (36.6 °C),  Heart Rate: 84, Resp: 17, BP: 119/70, SpO2: 97 %    This patient is a 25 y.o. Male with presented to the [facility] initially with altered mental status. Patient was found down by a friend with unknown substance use. EMS did administer 4 mg of Narcan which improved patient's respirations and mental status. Patient did become combative with EMS. Patient is work-up was concerning for possible retrocardiac pneumonia. Patient did have a mild elevated white count in addition with having a very grossly elevated lactic acid on presentation. Patient's repeat lactic has come down to acceptable levels. No concern for pneumonia after repeat.   Plan is awaken, reevaluate and discharge    ED Course as of 02/18/23 0641   Sat Feb 18, 2023   0023 Lactic Acid, Whole Blood(!): 6.0 [ML]   0053 WBC(!): 13.2  Concern for SIRS with elevated wbc and HR, however could be reactive to substances. [ML]   0114 Negative CT head, possible retrocardiac infiltrate seen on chest x-ray. Suggesting possible pneumonia. [ML]   0234 Lactic Acid, Whole Blood: 2.0 [ML]   0323 Care assumed from Dr. Zaynab Bruno awaiting reevaluation. [JS]   [id number] Patient care transferred to Dr. Abigail Gonzalez. [JS]   4558 Patient was aroused by attending physician as well as nursing staff. Patient has a safe ride home. Will discharge at this time [ES]      ED Course User Index  [ES] Alexa Adorno MD  [JS] Rubi Escalera DO  [ML] Obdulio Mcgill DO       OUTSTANDING TASKS / RECOMMENDATIONS:    Awake   Dispo     FINAL IMPRESSION:     1. Altered mental status, unspecified altered mental status type        DISPOSITION:         DISPOSITION:  [x]  Discharge   []  Transfer -    []  Admission -     []  Against Medical Advice   []  Eloped   FOLLOW-UP: Fernando Delgado MD  [address]   [address]  [address]  [phone]    Schedule an appointment as soon as possible for a visit       [facility]  [address] [facility] [address]  [phone]  Go to   If symptoms worsen   DISCHARGE MEDICATIONS: New Prescriptions    No medications on file           Alexa Adorno MD    [facility]       Alexa Adorno MD  Resident  02/18/23 2194       Alexa Adorno MD  Resident  02/18/23 4836

## 2023-02-18 NOTE — ED TRIAGE NOTES
Pt moved to ED 14 via stretcher by [facility]. Pt was found unresponsive with agonal respirations and pin point pupils by EMS. Pt was given 2mg Narcan IN and 4mg IV by EMS. Pt then became combative per EMS. Pt moved to bed and placed on cardiac monitor. NAD noted. Pt hard to arouse at this time. Vitals assessed and noted. Pt covered with warm blankets. Will continue to monitor.

## 2023-02-19 LAB
MICROORGANISM SPEC CULT: NORMAL
MICROORGANISM SPEC CULT: NORMAL
SERVICE CMNT-IMP: NORMAL
SERVICE CMNT-IMP: NORMAL
SPECIMEN DESCRIPTION: NORMAL
SPECIMEN DESCRIPTION: NORMAL

## 2023-05-23 ENCOUNTER — APPOINTMENT (OUTPATIENT)
Dept: GENERAL RADIOLOGY | Age: 23
End: 2023-05-23
Payer: COMMERCIAL

## 2023-05-23 ENCOUNTER — HOSPITAL ENCOUNTER (EMERGENCY)
Age: 23
Discharge: HOME | End: 2023-05-23
Attending: EMERGENCY MEDICINE
Payer: COMMERCIAL

## 2023-05-23 ENCOUNTER — APPOINTMENT (OUTPATIENT)
Dept: CT IMAGING | Age: 23
End: 2023-05-23
Payer: COMMERCIAL

## 2023-05-23 VITALS
OXYGEN SATURATION: 99 % | RESPIRATION RATE: 16 BRPM | BODY MASS INDEX: 22.49 KG/M2 | SYSTOLIC BLOOD PRESSURE: 116 MMHG | WEIGHT: 135 LBS | DIASTOLIC BLOOD PRESSURE: 75 MMHG | TEMPERATURE: 97.3 F | HEART RATE: 75 BPM
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
## 2023-05-23 PROCEDURE — 6370000000 HC RX 637 (ALT 250 FOR IP): Performed by: PEDIATRICS

## 2023-05-23 PROCEDURE — 73562 X-RAY EXAM OF KNEE 3: CPT

## 2023-05-23 PROCEDURE — 70450 CT HEAD/BRAIN W/O DYE: CPT

## 2023-05-23 PROCEDURE — 99284 EMERGENCY DEPT VISIT MOD MDM: CPT

## 2023-05-23 RX ORDER — ACETAMINOPHEN 500 MG
1000 TABLET ORAL 3 TIMES DAILY
Qty: 180 TABLET | Refills: 0 | Status: SHIPPED | OUTPATIENT
Start: 2023-05-23

## 2023-05-23 RX ORDER — ACETAMINOPHEN 325 MG/1
650 TABLET ORAL ONCE
Status: COMPLETED | OUTPATIENT
Start: 2023-05-23 | End: 2023-05-23

## 2023-05-23 RX ORDER — ACETAMINOPHEN 325 MG/1
650 TABLET ORAL ONCE
Status: DISCONTINUED | OUTPATIENT
Start: 2023-05-23 | End: 2023-05-23

## 2023-05-23 RX ORDER — IBUPROFEN 600 MG/1
600 TABLET ORAL 3 TIMES DAILY PRN
Qty: 30 TABLET | Refills: 0 | Status: SHIPPED | OUTPATIENT
Start: 2023-05-23

## 2023-05-23 RX ADMIN — ACETAMINOPHEN 650 MG: 325 TABLET ORAL at 12:15

## 2023-05-23 ASSESSMENT — PAIN DESCRIPTION - ORIENTATION: ORIENTATION: RIGHT

## 2023-05-23 ASSESSMENT — PAIN SCALES - GENERAL
PAINLEVEL_OUTOF10: 3
PAINLEVEL_OUTOF10: 7
PAINLEVEL_OUTOF10: 2

## 2023-05-23 ASSESSMENT — PAIN - FUNCTIONAL ASSESSMENT: PAIN_FUNCTIONAL_ASSESSMENT: 0-10

## 2023-05-23 ASSESSMENT — PAIN DESCRIPTION - LOCATION: LOCATION: KNEE
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
## 2023-05-23 NOTE — DISCHARGE INSTRUCTIONS
Ct head and xray knee negative. Call Bess Camara to schedule an appointment for your knee injury. See which office he works at that is closest to your house. Please feel free return to the hospital if your symptoms worsen or any new concerning symptoms develop. Follow-up with your primary care physician as needed for all other the concerns.

## 2023-05-23 NOTE — ED NOTES
Ace bandage wrapped around right knee and patient given ice pack to take home.       Ambrosio Banda RN  05/23/23 1400

## 2025-06-25 ENCOUNTER — HOSPITAL ENCOUNTER (EMERGENCY)
Age: 25
Discharge: HOME | End: 2025-06-25
Attending: STUDENT IN AN ORGANIZED HEALTH CARE EDUCATION/TRAINING PROGRAM
Payer: COMMERCIAL

## 2025-06-25 VITALS
DIASTOLIC BLOOD PRESSURE: 77 MMHG | SYSTOLIC BLOOD PRESSURE: 128 MMHG | OXYGEN SATURATION: 100 % | HEART RATE: 104 BPM | RESPIRATION RATE: 18 BRPM | BODY MASS INDEX: 24.61 KG/M2 | TEMPERATURE: 98.4 F | WEIGHT: 147.71 LBS

## 2025-06-25 DIAGNOSIS — H61.21 IMPACTED CERUMEN OF RIGHT EAR: Primary | ICD-10-CM

## 2025-06-25 PROCEDURE — 99283 EMERGENCY DEPT VISIT LOW MDM: CPT

## 2025-06-25 PROCEDURE — 6370000000 HC RX 637 (ALT 250 FOR IP): Performed by: STUDENT IN AN ORGANIZED HEALTH CARE EDUCATION/TRAINING PROGRAM

## 2025-06-25 PROCEDURE — 69209 REMOVE IMPACTED EAR WAX UNI: CPT

## 2025-06-25 RX ORDER — MINERAL OIL
OIL (ML) MISCELLANEOUS ONCE
Status: COMPLETED | OUTPATIENT
Start: 2025-06-25 | End: 2025-06-25

## 2025-06-25 RX ADMIN — Medication: at 10:21

## 2025-06-25 NOTE — DISCHARGE INSTRUCTIONS
Begin using the Debrox drops to help with disimpaction/loosening of the ear wax.     Follow up with PCP for re-evaluation and further clearing of the wax.     Do not use anything (qtips, paperclips, etc) in the ear as this can cause damage to the ear canal and ear drum and only pushes wax further into the ear causing buildup.    Return to ED if symptoms of infection (fever, drainage, swelling, increasing pain).

## 2025-06-25 NOTE — ED NOTES
Pt to Ed via self walked to chair with stable steady gait. C/o: right ear pain and decreased hearing. Per patient he was swimming a few days ago and developed pain and decreased hearing shortly after. Pt states trying to use Q-tips to up plug er with no resolution. Upon assessment pt right ear canal wnl no redness no swelling, notable dark ear wax noted in the posterior portion of the ear, no visibility of the ear drum. No drainage noted within ear canal but old drainage noted on the exterior of the ear. Pt is aox4, non labored breathing, states no dizziness, no headache, no numbness.

## 2025-06-25 NOTE — ED PROVIDER NOTES
[facility] EMERGENCY DEPARTMENT  EMERGENCY DEPARTMENT ENCOUNTER    Pt Name: Mervin Carter  MRN: [id number]  Birthdate2000  Date of evaluation: 6/25/2025    Note Started: 9:28 AM EDT    CHIEF COMPLAINT       Chief Complaint   Patient presents with    Ear Pain     HISTORY OF PRESENT ILLNESS    Mervin Carter is a 24 y.o. male who presents to the ED for ear pain which woke him up around 0700. He went swimming 3-4 days ago and thinks he may have swimmers ear. No drainage but he feels like there is \"something in there.\" Some decrease hearing. No fevers. No swelling.     REVIEW OF SYSTEMS       Review of Systems   Constitutional:  Negative for fever.   HENT:  Positive for ear pain.      PAST MEDICAL HISTORY    has a past medical history of ADHD (attention deficit hyperactivity disorder).    SURGICAL HISTORY      has no past surgical history on file.    CURRENT MEDICATIONS       Discharge Medication List as of 6/25/2025 10:53 AM        CONTINUE these medications which have NOT CHANGED    Details   acetaminophen (TYLENOL) 500 MG tablet Take 2 tablets by mouth 3 times daily, Disp-180 tablet, R-0Print      ibuprofen (ADVIL;MOTRIN) 600 MG tablet Take 1 tablet by mouth 3 times daily as needed for Pain, Disp-30 tablet, R-0Print             ALLERGIES     has no known allergies.    FAMILY HISTORY     He indicated that his mother is alive. He indicated that his father is alive. He indicated that his sister is alive. He indicated that his brother is alive.     family history includes Allergy (Severe) in his mother; Anemia in his mother; Diabetes in his mother; High Blood Pressure in his mother; No Known Problems in his brother, father, and sister.    SOCIAL HISTORY      reports that he has been smoking cigars. He has never used smokeless tobacco. He reports current alcohol use. He reports that he does not currently use drugs after having used the following drugs: Marijuana (Weed).    PHYSICAL EXAM